# Patient Record
(demographics unavailable — no encounter records)

---

## 2024-11-16 NOTE — HISTORY OF PRESENT ILLNESS
[FreeTextEntry1] : Follow-up of chronic medical conditions, and prescription refills. [de-identified] : Patient presents for follow-up of chronic medical conditions-CAD, PAF, hypertension, hyperlipidemia, vitamin D deficiency-and prescription refills. New onset rapid atrial fibrillation on Eliquis with spontaneous conversion. Work-up included CCTA which revealed three-vessel disease. Underwent rotational atherectomy/PCI of LAD at [facility] on 01/21/2022. Patient tolerated procedure well and remains asymptomatic. In addition, underwent right carotid endarterectomy on 12/08/2022. Tolerated procedure well and has had no symptoms related to the carotid system. Feels well in general. No new symptoms. Reports no new cardiovascular symptoms. Symptoms well controlled on present medication regimen. Tolerating all prescription medications. Needs maintenance laboratory testing. Refuses influenza immunization.

## 2024-11-16 NOTE — HISTORY OF PRESENT ILLNESS
[FreeTextEntry1] : Follow-up of chronic medical conditions, and prescription refills. [de-identified] : Patient presents for follow-up of chronic medical conditions-CAD, PAF, hypertension, hyperlipidemia, vitamin D deficiency-and prescription refills. New onset rapid atrial fibrillation on Eliquis with spontaneous conversion. Work-up included CCTA which revealed three-vessel disease. Underwent rotational atherectomy/PCI of LAD at [facility] on 01/21/2022. Patient tolerated procedure well and remains asymptomatic. In addition, underwent right carotid endarterectomy on 12/08/2022. Tolerated procedure well and has had no symptoms related to the carotid system. Feels well in general. No new symptoms. Reports no new cardiovascular symptoms. Symptoms well controlled on present medication regimen. Tolerating all prescription medications. Needs maintenance laboratory testing. Refuses influenza immunization.

## 2024-11-16 NOTE — PHYSICAL EXAM
[No Acute Distress] : no acute distress [Well Nourished] : well nourished [Well Developed] : well developed [Well-Appearing] : well-appearing [Normal Voice/Communication] : normal voice/communication [EOMI] : extraocular movements intact [No JVD] : no jugular venous distention [Supple] : supple [No Respiratory Distress] : no respiratory distress  [Clear to Auscultation] : lungs were clear to auscultation bilaterally [Normal Rate] : normal rate  [Regular Rhythm] : with a regular rhythm [Normal S1, S2] : normal S1 and S2 [No Murmur] : no murmur heard [No Carotid Bruits] : no carotid bruits [Pedal Pulses Present] : the pedal pulses are present [No Edema] : there was no peripheral edema [Soft] : abdomen soft [Non Tender] : non-tender [No HSM] : no HSM [No CVA Tenderness] : no CVA  tenderness [No Spinal Tenderness] : no spinal tenderness [No Joint Swelling] : no joint swelling [Grossly Normal Strength/Tone] : grossly normal strength/tone [No Rash] : no rash [Coordination Grossly Intact] : coordination grossly intact [No Focal Deficits] : no focal deficits [Normal Gait] : normal gait [Speech Grossly Normal] : speech grossly normal [Normal Affect] : the affect was normal [Alert and Oriented x3] : oriented to person, place, and time [Normal Insight/Judgement] : insight and judgment were intact [de-identified] : (+)bilateral carotid bruits-R>L, good upstroke bilaterally

## 2024-11-16 NOTE — PHYSICAL EXAM
[No Acute Distress] : no acute distress [Well Nourished] : well nourished [Well Developed] : well developed [Well-Appearing] : well-appearing [Normal Voice/Communication] : normal voice/communication [EOMI] : extraocular movements intact [No JVD] : no jugular venous distention [Supple] : supple [No Respiratory Distress] : no respiratory distress  [Clear to Auscultation] : lungs were clear to auscultation bilaterally [Normal Rate] : normal rate  [Regular Rhythm] : with a regular rhythm [Normal S1, S2] : normal S1 and S2 [No Murmur] : no murmur heard [No Carotid Bruits] : no carotid bruits [Pedal Pulses Present] : the pedal pulses are present [No Edema] : there was no peripheral edema [Soft] : abdomen soft [Non Tender] : non-tender [No HSM] : no HSM [No CVA Tenderness] : no CVA  tenderness [No Spinal Tenderness] : no spinal tenderness [No Joint Swelling] : no joint swelling [Grossly Normal Strength/Tone] : grossly normal strength/tone [No Rash] : no rash [Coordination Grossly Intact] : coordination grossly intact [No Focal Deficits] : no focal deficits [Normal Gait] : normal gait [Speech Grossly Normal] : speech grossly normal [Normal Affect] : the affect was normal [Alert and Oriented x3] : oriented to person, place, and time [Normal Insight/Judgement] : insight and judgment were intact [de-identified] : (+)bilateral carotid bruits-R>L, good upstroke bilaterally

## 2024-11-16 NOTE — HEALTH RISK ASSESSMENT
[Yes] : Yes [Monthly or less (1 pt)] : Monthly or less (1 point) [1 or 2 (0 pts)] : 1 or 2 (0 points) [Never (0 pts)] : Never (0 points) [No] : In the past 12 months have you used drugs other than those required for medical reasons? No [0] : 2) Feeling down, depressed, or hopeless: Not at all (0) [PHQ-2 Negative - No further assessment needed] : PHQ-2 Negative - No further assessment needed [Audit-CScore] : 0 [[id number]] : 0 [Never] : Never

## 2024-11-16 NOTE — HEALTH RISK ASSESSMENT
[Yes] : Yes [Monthly or less (1 pt)] : Monthly or less (1 point) [1 or 2 (0 pts)] : 1 or 2 (0 points) [Never (0 pts)] : Never (0 points) [No] : In the past 12 months have you used drugs other than those required for medical reasons? No [0] : 2) Feeling down, depressed, or hopeless: Not at all (0) [PHQ-2 Negative - No further assessment needed] : PHQ-2 Negative - No further assessment needed [Audit-CScore] : 0 [[id number]] : 0 [Never] : Never

## 2024-11-16 NOTE — PHYSICAL EXAM
[No Acute Distress] : no acute distress [Well Nourished] : well nourished [Well Developed] : well developed [Well-Appearing] : well-appearing [Normal Voice/Communication] : normal voice/communication [EOMI] : extraocular movements intact [No JVD] : no jugular venous distention [Supple] : supple [No Respiratory Distress] : no respiratory distress  [Clear to Auscultation] : lungs were clear to auscultation bilaterally [Normal Rate] : normal rate  [Regular Rhythm] : with a regular rhythm [Normal S1, S2] : normal S1 and S2 [No Murmur] : no murmur heard [No Carotid Bruits] : no carotid bruits [Pedal Pulses Present] : the pedal pulses are present [No Edema] : there was no peripheral edema [Soft] : abdomen soft [Non Tender] : non-tender [No HSM] : no HSM [No CVA Tenderness] : no CVA  tenderness [No Spinal Tenderness] : no spinal tenderness [No Joint Swelling] : no joint swelling [Grossly Normal Strength/Tone] : grossly normal strength/tone [No Rash] : no rash [Coordination Grossly Intact] : coordination grossly intact [No Focal Deficits] : no focal deficits [Normal Gait] : normal gait [Speech Grossly Normal] : speech grossly normal [Normal Affect] : the affect was normal [Alert and Oriented x3] : oriented to person, place, and time [Normal Insight/Judgement] : insight and judgment were intact [de-identified] : (+)bilateral carotid bruits-R>L, good upstroke bilaterally

## 2024-11-16 NOTE — REVIEW OF SYSTEMS
[Fatigue] : no fatigue [Recent Change In Weight] : ~T no recent weight change [Earache] : no earache [Chest Pain] : no chest pain [Palpitations] : no palpitations [Claudication] : no  leg claudication [Lower Ext Edema] : no lower extremity edema [Orthopena] : no orthopnea [Paroxysmal Nocturnal Dyspnea] : no paroxysmal nocturnal dyspnea [Shortness Of Breath] : no shortness of breath [Wheezing] : no wheezing [Cough] : no cough [Dyspnea on Exertion] : not dyspnea on exertion [Abdominal Pain] : no abdominal pain [Nausea] : no nausea [Vomiting] : no vomiting [Heartburn] : no heartburn [Melena] : no melena [Dysuria] : no dysuria [Hematuria] : no hematuria [Skin Rash] : no skin rash [Swollen Glands] : no swollen glands [Negative] : Heme/Lymph

## 2024-11-16 NOTE — HISTORY OF PRESENT ILLNESS
[FreeTextEntry1] : Follow-up of chronic medical conditions, and prescription refills. [de-identified] : Patient presents for follow-up of chronic medical conditions-CAD, PAF, hypertension, hyperlipidemia, vitamin D deficiency-and prescription refills. New onset rapid atrial fibrillation on Eliquis with spontaneous conversion. Work-up included CCTA which revealed three-vessel disease. Underwent rotational atherectomy/PCI of LAD at [facility] on 01/21/2022. Patient tolerated procedure well and remains asymptomatic. In addition, underwent right carotid endarterectomy on 12/08/2022. Tolerated procedure well and has had no symptoms related to the carotid system. Feels well in general. No new symptoms. Reports no new cardiovascular symptoms. Symptoms well controlled on present medication regimen. Tolerating all prescription medications. Needs maintenance laboratory testing. Refuses influenza immunization.

## 2025-03-07 NOTE — HEALTH RISK ASSESSMENT
[Yes] : Yes [Monthly or less (1 pt)] : Monthly or less (1 point) [1 or 2 (0 pts)] : 1 or 2 (0 points) [Never (0 pts)] : Never (0 points) [No] : In the past 12 months have you used drugs other than those required for medical reasons? No [0] : 2) Feeling down, depressed, or hopeless: Not at all (0) [PHQ-2 Negative - No further assessment needed] : PHQ-2 Negative - No further assessment needed [Never] : Never [Audit-CScore] : 0 [[id number]] : 0

## 2025-03-07 NOTE — PHYSICAL EXAM
[No Acute Distress] : no acute distress [Well Nourished] : well nourished [Well Developed] : well developed [Well-Appearing] : well-appearing [Normal Voice/Communication] : normal voice/communication [EOMI] : extraocular movements intact [No JVD] : no jugular venous distention [Supple] : supple [No Respiratory Distress] : no respiratory distress  [Clear to Auscultation] : lungs were clear to auscultation bilaterally [Normal Rate] : normal rate  [Regular Rhythm] : with a regular rhythm [Normal S1, S2] : normal S1 and S2 [No Murmur] : no murmur heard [No Carotid Bruits] : no carotid bruits [Pedal Pulses Present] : the pedal pulses are present [No Edema] : there was no peripheral edema [Soft] : abdomen soft [Non Tender] : non-tender [No HSM] : no HSM [Normal Bowel Sounds] : normal bowel sounds [Normal Axillary Nodes] : no axillary lymphadenopathy [Normal Posterior Cervical Nodes] : no posterior cervical lymphadenopathy [Normal Anterior Cervical Nodes] : no anterior cervical lymphadenopathy [Normal Inguinal Nodes] : no inguinal lymphadenopathy [No CVA Tenderness] : no CVA  tenderness [No Spinal Tenderness] : no spinal tenderness [No Joint Swelling] : no joint swelling [Grossly Normal Strength/Tone] : grossly normal strength/tone [No Rash] : no rash [Coordination Grossly Intact] : coordination grossly intact [No Focal Deficits] : no focal deficits [Normal Gait] : normal gait [de-identified] : (+)bilateral carotid bruits-R>L, good upstroke bilaterally

## 2025-03-07 NOTE — PHYSICAL EXAM
[No Acute Distress] : no acute distress [Well Nourished] : well nourished [Well Developed] : well developed [Well-Appearing] : well-appearing [Normal Voice/Communication] : normal voice/communication [EOMI] : extraocular movements intact [No JVD] : no jugular venous distention [Supple] : supple [No Respiratory Distress] : no respiratory distress  [Clear to Auscultation] : lungs were clear to auscultation bilaterally [Normal Rate] : normal rate  [Regular Rhythm] : with a regular rhythm [Normal S1, S2] : normal S1 and S2 [No Murmur] : no murmur heard [No Carotid Bruits] : no carotid bruits [Pedal Pulses Present] : the pedal pulses are present [No Edema] : there was no peripheral edema [Soft] : abdomen soft [Non Tender] : non-tender [No HSM] : no HSM [Normal Bowel Sounds] : normal bowel sounds [Normal Axillary Nodes] : no axillary lymphadenopathy [Normal Posterior Cervical Nodes] : no posterior cervical lymphadenopathy [Normal Anterior Cervical Nodes] : no anterior cervical lymphadenopathy [Normal Inguinal Nodes] : no inguinal lymphadenopathy [No CVA Tenderness] : no CVA  tenderness [No Spinal Tenderness] : no spinal tenderness [No Joint Swelling] : no joint swelling [Grossly Normal Strength/Tone] : grossly normal strength/tone [No Rash] : no rash [Coordination Grossly Intact] : coordination grossly intact [No Focal Deficits] : no focal deficits [Normal Gait] : normal gait [de-identified] : (+)bilateral carotid bruits-R>L, good upstroke bilaterally

## 2025-03-07 NOTE — HEALTH RISK ASSESSMENT
[Yes] : Yes [Monthly or less (1 pt)] : Monthly or less (1 point) [1 or 2 (0 pts)] : 1 or 2 (0 points) [Never (0 pts)] : Never (0 points) [No] : In the past 12 months have you used drugs other than those required for medical reasons? No [0] : 2) Feeling down, depressed, or hopeless: Not at all (0) [PHQ-2 Negative - No further assessment needed] : PHQ-2 Negative - No further assessment needed [Never] : Never [Audit-CScore] : 0 [[id number]] : 0

## 2025-03-07 NOTE — HISTORY OF PRESENT ILLNESS
[FreeTextEntry1] : Follow-up of chronic medical conditions, and prescription refills. [de-identified] : Patient presents for follow-up of chronic medical conditions-CAD, PAF, hypertension, hyperlipidemia, vitamin D deficiency-and prescription refills. New onset rapid atrial fibrillation on Eliquis with spontaneous conversion. Work-up included CCTA which revealed three-vessel disease. Underwent rotational atherectomy/PCI of LAD at [facility] on 01/21/2022. Patient tolerated procedure well and remains asymptomatic. In addition, underwent right carotid endarterectomy on 12/08/2022. Tolerated procedure well and has had no symptoms related to the carotid system. Feels well in general. No new symptoms. Reports no new cardiovascular symptoms. Symptoms well controlled on present medication regimen. Tolerating all prescription medications. Needs maintenance laboratory testing. Refuses influenza immunization.

## 2025-03-07 NOTE — HISTORY OF PRESENT ILLNESS
[FreeTextEntry1] : Follow-up of chronic medical conditions, and prescription refills. [de-identified] : Patient presents for follow-up of chronic medical conditions-CAD, PAF, hypertension, hyperlipidemia, vitamin D deficiency-and prescription refills. New onset rapid atrial fibrillation on Eliquis with spontaneous conversion. Work-up included CCTA which revealed three-vessel disease. Underwent rotational atherectomy/PCI of LAD at [facility] on 01/21/2022. Patient tolerated procedure well and remains asymptomatic. In addition, underwent right carotid endarterectomy on 12/08/2022. Tolerated procedure well and has had no symptoms related to the carotid system. Feels well in general. No new symptoms. Reports no new cardiovascular symptoms. Symptoms well controlled on present medication regimen. Tolerating all prescription medications. Needs maintenance laboratory testing. Refuses influenza immunization.

## 2025-03-07 NOTE — REVIEW OF SYSTEMS
[Negative] : Heme/Lymph [Fatigue] : no fatigue [Recent Change In Weight] : ~T no recent weight change [Earache] : no earache [Chest Pain] : no chest pain [Palpitations] : no palpitations [Claudication] : no  leg claudication [Lower Ext Edema] : no lower extremity edema [Orthopena] : no orthopnea [Paroxysmal Nocturnal Dyspnea] : no paroxysmal nocturnal dyspnea [Shortness Of Breath] : no shortness of breath [Wheezing] : no wheezing [Cough] : no cough [Dyspnea on Exertion] : not dyspnea on exertion [Abdominal Pain] : no abdominal pain [Nausea] : no nausea [Vomiting] : no vomiting [Heartburn] : no heartburn [Melena] : no melena [Dysuria] : no dysuria [Hematuria] : no hematuria [Skin Rash] : no skin rash [Swollen Glands] : no swollen glands

## 2025-07-01 NOTE — PHYSICAL EXAM
[No Acute Distress] : no acute distress [Well Nourished] : well nourished [Well Developed] : well developed [Well-Appearing] : well-appearing [Normal Voice/Communication] : normal voice/communication [EOMI] : extraocular movements intact [No JVD] : no jugular venous distention [Supple] : supple [No Respiratory Distress] : no respiratory distress  [Clear to Auscultation] : lungs were clear to auscultation bilaterally [Normal Rate] : normal rate  [Regular Rhythm] : with a regular rhythm [Normal S1, S2] : normal S1 and S2 [No Murmur] : no murmur heard [No Carotid Bruits] : no carotid bruits [Pedal Pulses Present] : the pedal pulses are present [No Edema] : there was no peripheral edema [Soft] : abdomen soft [Non Tender] : non-tender [No HSM] : no HSM [Normal Bowel Sounds] : normal bowel sounds [Normal Axillary Nodes] : no axillary lymphadenopathy [Normal Posterior Cervical Nodes] : no posterior cervical lymphadenopathy [Normal Anterior Cervical Nodes] : no anterior cervical lymphadenopathy [Normal Inguinal Nodes] : no inguinal lymphadenopathy [No CVA Tenderness] : no CVA  tenderness [No Spinal Tenderness] : no spinal tenderness [No Joint Swelling] : no joint swelling [Grossly Normal Strength/Tone] : grossly normal strength/tone [No Rash] : no rash [Coordination Grossly Intact] : coordination grossly intact [No Focal Deficits] : no focal deficits [Normal Gait] : normal gait [de-identified] : (+)bilateral carotid bruits-R>L, good upstroke bilaterally [de-identified] : (-)SLR-bilaterally

## 2025-07-01 NOTE — HISTORY OF PRESENT ILLNESS
[FreeTextEntry1] : Follow-up of chronic medical conditions, and prescription refills. [de-identified] : Patient presents for follow-up of chronic medical conditions-CAD, PAF, hypertension, hyperlipidemia, vitamin D deficiency-and prescription refills. New onset rapid atrial fibrillation on Eliquis with spontaneous conversion. Work-up included CCTA which revealed three-vessel disease. Underwent rotational atherectomy/PCI of LAD at [facility] on 01/21/2022. Patient tolerated procedure well and remains asymptomatic. In addition, underwent right carotid endarterectomy on 12/08/2022. Tolerated procedure well and has had no symptoms related to the carotid system. Now reporting left buttock discomfort radiating down the leg with prolonged standing and walking. Improved with sitting. Requesting referral for physical therapy. Otherwise, feels well in general. No other new symptoms. Reports no new cardiovascular symptoms. Symptoms well controlled on present medication regimen. Tolerating all prescription medications. Needs maintenance laboratory testing.

## 2025-07-01 NOTE — REVIEW OF SYSTEMS
[Negative] : Heme/Lymph [Back Pain] : back pain [Fatigue] : no fatigue [Recent Change In Weight] : ~T no recent weight change [Earache] : no earache [Chest Pain] : no chest pain [Palpitations] : no palpitations [Claudication] : no  leg claudication [Lower Ext Edema] : no lower extremity edema [Orthopena] : no orthopnea [Paroxysmal Nocturnal Dyspnea] : no paroxysmal nocturnal dyspnea [Shortness Of Breath] : no shortness of breath [Wheezing] : no wheezing [Cough] : no cough [Dyspnea on Exertion] : not dyspnea on exertion [Abdominal Pain] : no abdominal pain [Nausea] : no nausea [Vomiting] : no vomiting [Heartburn] : no heartburn [Melena] : no melena [Dysuria] : no dysuria [Hematuria] : no hematuria [Skin Rash] : no skin rash [Swollen Glands] : no swollen glands

## 2025-07-01 NOTE — HEALTH RISK ASSESSMENT
[Yes] : Yes [Monthly or less (1 pt)] : Monthly or less (1 point) [1 or 2 (0 pts)] : 1 or 2 (0 points) [Never (0 pts)] : Never (0 points) [No falls in past year] : Patient reported no falls in the past year [0] : 2) Feeling down, depressed, or hopeless: Not at all (0) [PHQ-2 Negative - No further assessment needed] : PHQ-2 Negative - No further assessment needed [Never] : Never [No] : In the past 12 months have you used drugs other than those required for medical reasons? No [Audit-CScore] : 0 [[id number]] : 0

## 2025-07-23 NOTE — END OF VISIT
[FreeTextEntry3] :  All medical record entries made by the javanibe were at ÁLVARO farrell KENNETH, direction and personally dictated by me on 7/23/2025. I have reviewed the chart and agree that the record accurately reflects my personal performance of the history, physical exam, assessment, and plan. I have also personally directed, reviewed, and agreed with the chart.

## 2025-07-23 NOTE — HISTORY OF PRESENT ILLNESS
[FreeTextEntry1] : 81 yo male referred by Dr. Figueredo for a high grade asymptomatic carotid stenosis. The patient reports that he was found to have a bruit several years ago. He underwent a carotid duplex at that time which did not demonstrate significant disease. He underwent a repeat duplex in February which demonstrated > 80% stenosis. A CTA demonstrated 70% stenosis. He continues to deny amaurosis fugax, motor or sensory deficits. He takes Plavix and Lipitor. He is on Eliquis for atrial fibrillation.  [de-identified] : 83-year-old male returns in follow up. He is s/p a right carotid endarterectomy for a high grade asymptomatic carotid stenosis. He continues to deny amaurosis fugax, motor or sensory deficits. The patient continues to take Aspirin,  Eliquis  and a high dose statin. He underwent a carotid duplex. He is here to discuss the results.

## 2025-07-23 NOTE — PHYSICAL EXAM
[2+] : left 2+ [Ankle Swelling Bilaterally] : bilaterally  [JVD] : no jugular venous distention  [Ankle Swelling (On Exam)] : not present [de-identified] : Awake and Alert [de-identified] : Incision - well healed [de-identified] : Appropriate [de-identified] :  No gross motor or sensory deficits

## 2025-07-23 NOTE — ASSESSMENT
[FreeTextEntry1] : 84 yo male s/p a right carotid endarterectomy.  He continues to deny any symptoms. He underwent a carotid duplex today which demonstrated a widely patent post CEA carotid on the right and mild stenosis on the left.   I recommended that he continue Statin and Eliquis.  Follow up in 1 year for a repeat carotid duplex.